# Patient Record
Sex: FEMALE | Race: WHITE | Employment: UNEMPLOYED | URBAN - METROPOLITAN AREA
[De-identification: names, ages, dates, MRNs, and addresses within clinical notes are randomized per-mention and may not be internally consistent; named-entity substitution may affect disease eponyms.]

---

## 2017-01-01 ENCOUNTER — HOSPITAL ENCOUNTER (OUTPATIENT)
Dept: OTHER | Age: 27
Discharge: HOME | End: 2017-01-31
Attending: OBSTETRICS & GYNECOLOGY | Admitting: OBSTETRICS & GYNECOLOGY

## 2017-01-02 PROBLEM — E86.0 DEHYDRATION: Status: ACTIVE | Noted: 2017-01-02

## 2017-02-06 PROBLEM — O47.9 THREATENED LABOR: Status: ACTIVE | Noted: 2017-02-06

## 2018-09-27 PROBLEM — E86.0 DEHYDRATION: Status: RESOLVED | Noted: 2017-01-02 | Resolved: 2018-09-27

## 2019-12-15 ENCOUNTER — APPOINTMENT (OUTPATIENT)
Dept: ULTRASOUND IMAGING | Age: 29
End: 2019-12-15
Payer: COMMERCIAL

## 2019-12-15 ENCOUNTER — HOSPITAL ENCOUNTER (EMERGENCY)
Age: 29
Discharge: HOME | End: 2019-12-16
Attending: EMERGENCY MEDICINE
Payer: COMMERCIAL

## 2019-12-15 DIAGNOSIS — O46.90 VAGINAL BLEEDING IN PREGNANCY: ICD-10-CM

## 2019-12-15 DIAGNOSIS — O20.0 THREATENED MISCARRIAGE: Primary | ICD-10-CM

## 2019-12-15 DIAGNOSIS — Z29.13 NEED FOR RHOGAM DUE TO RH NEGATIVE MOTHER: ICD-10-CM

## 2019-12-15 LAB
A/G RATIO: 1.3 (ref 1.1–2.2)
ABO/RH: NORMAL
ALBUMIN SERPL-MCNC: 4 G/DL (ref 3.4–5)
ALP BLD-CCNC: 42 U/L (ref 40–129)
ALT SERPL-CCNC: 12 U/L (ref 10–40)
ANION GAP SERPL CALCULATED.3IONS-SCNC: 11 MMOL/L (ref 3–16)
ANTIBODY SCREEN: NORMAL
AST SERPL-CCNC: 25 U/L (ref 15–37)
BASOPHILS ABSOLUTE: 0.1 K/UL (ref 0–0.2)
BASOPHILS RELATIVE PERCENT: 0.6 %
BILIRUB SERPL-MCNC: <0.2 MG/DL (ref 0–1)
BUN BLDV-MCNC: 10 MG/DL (ref 7–20)
CALCIUM SERPL-MCNC: 9.1 MG/DL (ref 8.3–10.6)
CHLORIDE BLD-SCNC: 101 MMOL/L (ref 99–110)
CO2: 21 MMOL/L (ref 21–32)
CREAT SERPL-MCNC: 0.6 MG/DL (ref 0.6–1.1)
EOSINOPHILS ABSOLUTE: 0.1 K/UL (ref 0–0.6)
EOSINOPHILS RELATIVE PERCENT: 0.6 %
GFR AFRICAN AMERICAN: >60
GFR NON-AFRICAN AMERICAN: >60
GLOBULIN: 3.1 G/DL
GLUCOSE BLD-MCNC: 126 MG/DL (ref 70–99)
GONADOTROPIN, CHORIONIC (HCG) QUANT: NORMAL MIU/ML
HCT VFR BLD CALC: 37.2 % (ref 36–48)
HEMOGLOBIN: 12.9 G/DL (ref 12–16)
LYMPHOCYTES ABSOLUTE: 2.2 K/UL (ref 1–5.1)
LYMPHOCYTES RELATIVE PERCENT: 20.3 %
MCH RBC QN AUTO: 31.2 PG (ref 26–34)
MCHC RBC AUTO-ENTMCNC: 34.6 G/DL (ref 31–36)
MCV RBC AUTO: 90.1 FL (ref 80–100)
MONOCYTES ABSOLUTE: 0.6 K/UL (ref 0–1.3)
MONOCYTES RELATIVE PERCENT: 5.5 %
NEUTROPHILS ABSOLUTE: 7.7 K/UL (ref 1.7–7.7)
NEUTROPHILS RELATIVE PERCENT: 73 %
PDW BLD-RTO: 12.6 % (ref 12.4–15.4)
PLATELET # BLD: 337 K/UL (ref 135–450)
PMV BLD AUTO: 7.1 FL (ref 5–10.5)
POTASSIUM SERPL-SCNC: 4.8 MMOL/L (ref 3.5–5.1)
RBC # BLD: 4.13 M/UL (ref 4–5.2)
SODIUM BLD-SCNC: 133 MMOL/L (ref 136–145)
TOTAL PROTEIN: 7.1 G/DL (ref 6.4–8.2)
WBC # BLD: 10.6 K/UL (ref 4–11)

## 2019-12-15 PROCEDURE — 86901 BLOOD TYPING SEROLOGIC RH(D): CPT

## 2019-12-15 PROCEDURE — 86850 RBC ANTIBODY SCREEN: CPT

## 2019-12-15 PROCEDURE — 85025 COMPLETE CBC W/AUTO DIFF WBC: CPT

## 2019-12-15 PROCEDURE — 86900 BLOOD TYPING SEROLOGIC ABO: CPT

## 2019-12-15 PROCEDURE — 76801 OB US < 14 WKS SINGLE FETUS: CPT

## 2019-12-15 PROCEDURE — 99284 EMERGENCY DEPT VISIT MOD MDM: CPT

## 2019-12-15 PROCEDURE — 76817 TRANSVAGINAL US OBSTETRIC: CPT

## 2019-12-15 PROCEDURE — 84702 CHORIONIC GONADOTROPIN TEST: CPT

## 2019-12-15 PROCEDURE — 80053 COMPREHEN METABOLIC PANEL: CPT

## 2019-12-15 ASSESSMENT — PAIN SCALES - GENERAL: PAINLEVEL_OUTOF10: 2

## 2019-12-16 VITALS
TEMPERATURE: 98 F | HEART RATE: 79 BPM | BODY MASS INDEX: 20.4 KG/M2 | SYSTOLIC BLOOD PRESSURE: 110 MMHG | RESPIRATION RATE: 18 BRPM | DIASTOLIC BLOOD PRESSURE: 60 MMHG | WEIGHT: 130 LBS | OXYGEN SATURATION: 97 % | HEIGHT: 67 IN

## 2019-12-16 LAB — RHIG LOT NUMBER: NORMAL

## 2019-12-16 PROCEDURE — 2580000003 HC RX 258: Performed by: EMERGENCY MEDICINE

## 2019-12-16 PROCEDURE — 96360 HYDRATION IV INFUSION INIT: CPT

## 2019-12-16 PROCEDURE — 96372 THER/PROPH/DIAG INJ SC/IM: CPT

## 2019-12-16 PROCEDURE — 6360000002 HC RX W HCPCS: Performed by: NURSE PRACTITIONER

## 2019-12-16 RX ORDER — 0.9 % SODIUM CHLORIDE 0.9 %
500 INTRAVENOUS SOLUTION INTRAVENOUS ONCE
Status: COMPLETED | OUTPATIENT
Start: 2019-12-16 | End: 2019-12-16

## 2019-12-16 RX ADMIN — HUMAN RHO(D) IMMUNE GLOBULIN 300 MCG: 300 INJECTION, SOLUTION INTRAMUSCULAR at 02:42

## 2019-12-16 RX ADMIN — SODIUM CHLORIDE 500 ML: 9 INJECTION, SOLUTION INTRAVENOUS at 01:42

## 2020-04-15 ENCOUNTER — TELEPHONE (OUTPATIENT)
Dept: OBGYN CLINIC | Age: 30
End: 2020-04-15

## 2020-04-16 ENCOUNTER — OFFICE VISIT (OUTPATIENT)
Dept: OBGYN CLINIC | Age: 30
End: 2020-04-16
Payer: COMMERCIAL

## 2020-04-16 VITALS
TEMPERATURE: 98 F | HEIGHT: 66 IN | WEIGHT: 131.8 LBS | HEART RATE: 79 BPM | DIASTOLIC BLOOD PRESSURE: 62 MMHG | BODY MASS INDEX: 21.18 KG/M2 | SYSTOLIC BLOOD PRESSURE: 102 MMHG

## 2020-04-16 LAB
BASOPHILS ABSOLUTE: 0 K/UL (ref 0–0.2)
BASOPHILS RELATIVE PERCENT: 0.6 %
EOSINOPHILS ABSOLUTE: 0.1 K/UL (ref 0–0.6)
EOSINOPHILS RELATIVE PERCENT: 1.8 %
GONADOTROPIN, CHORIONIC (HCG) QUANT: <5 MIU/ML
HCT VFR BLD CALC: 38.9 % (ref 36–48)
HEMOGLOBIN: 13.1 G/DL (ref 12–16)
LYMPHOCYTES ABSOLUTE: 2.3 K/UL (ref 1–5.1)
LYMPHOCYTES RELATIVE PERCENT: 43.3 %
MCH RBC QN AUTO: 29.5 PG (ref 26–34)
MCHC RBC AUTO-ENTMCNC: 33.7 G/DL (ref 31–36)
MCV RBC AUTO: 87.4 FL (ref 80–100)
MONOCYTES ABSOLUTE: 0.3 K/UL (ref 0–1.3)
MONOCYTES RELATIVE PERCENT: 6 %
NEUTROPHILS ABSOLUTE: 2.6 K/UL (ref 1.7–7.7)
NEUTROPHILS RELATIVE PERCENT: 48.3 %
PDW BLD-RTO: 13.2 % (ref 12.4–15.4)
PLATELET # BLD: 287 K/UL (ref 135–450)
PMV BLD AUTO: 7.7 FL (ref 5–10.5)
RBC # BLD: 4.45 M/UL (ref 4–5.2)
WBC # BLD: 5.4 K/UL (ref 4–11)

## 2020-04-16 PROCEDURE — 99203 OFFICE O/P NEW LOW 30 MIN: CPT | Performed by: OBSTETRICS & GYNECOLOGY

## 2020-04-16 PROCEDURE — 36415 COLL VENOUS BLD VENIPUNCTURE: CPT | Performed by: OBSTETRICS & GYNECOLOGY

## 2020-04-16 PROCEDURE — 76856 US EXAM PELVIC COMPLETE: CPT | Performed by: OBSTETRICS & GYNECOLOGY

## 2020-04-16 RX ORDER — PNV NO.118/IRON FUMARATE/FA 29 MG-1 MG
1 TABLET,CHEWABLE ORAL DAILY
COMMUNITY

## 2020-04-16 NOTE — PROGRESS NOTES
Cardiovascular: Negative for chest pain and palpitations. Gastrointestinal: Negative for abdominal pain, constipation, diarrhea, nausea and vomiting. Genitourinary: Positive for menstrual problem and vaginal bleeding. Negative for dyspareunia, dysuria, frequency, pelvic pain and vaginal discharge. Musculoskeletal: Negative for back pain. Skin: Negative. Neurological: Negative. Psychiatric/Behavioral: Negative. Primary Care Physician: Do Not Send To PCP (Inactive)    Obstetric History  OB History    Para Term  AB Living   4 2 2 0 1 2   SAB TAB Ectopic Molar Multiple Live Births   1 0 0   0 2      # Outcome Date GA Lbr Omero/2nd Weight Sex Delivery Anes PTL Lv   4 Current            3 SAB 2019           2 Term 17 40w2d  7 lb 11 oz (3.487 kg) M Vag-Spont None N HARPER   1 Term 14 39w3d 04:03 7 lb 3 oz (3.26 kg) F Vag-Spont None  HARPER       GynecologicHistory  Menstrual History:   LMP: Patient's last menstrual period was 2020 (approximate).  Menstrual Period: regular   Interval Between Menses: 28-30 days   Duration of Menses: 7 days   Menstrual Flow: Heavier on days 2-4   Bleeding between menses: Denies    Sexual History:   Contraception: see above   Currently is sexually active   2 Lifetime partners   Denies history of STIs   Denies sexual problems    Pap History:   History of abnormal pap smears: see above   Last pap: see above      Medical History:  Past Medical History:   Diagnosis Date    Rh incompatibility        Medications:  Current Outpatient Medications   Medication Sig Dispense Refill    Prenatal Vit-Fe Fumarate-FA (PRENATAL VITAMIN) CHEW Take 1 tablet by mouth daily      Misc Natural Products (PROGESTERONE EX) Apply 1 applicator topically daily Internal Pe[place]am 25mg natural progesterone cream & 2000 IU Vitamin D       No current facility-administered medications for this visit.         Surgical History:  Past Surgical History:   Procedure DOPPLER INTERROGATION   NON OB    DATE: 4/16/20    PHYSICIAN: GÉNESIS Barnett.     SONOGRAPHER: Fern Lindsay RUST    INDICATION: Bleeding with a positive preg test    TYPE OF SCAN: vaginal    FINDINGS:    The cul de sac is normal. No free fluid appreciated. The cervix is normal and not enlarged. Nabothian cyst/s is not noted within the uterine cervix. The uterus measures 8.71 cm x 5.32 cm x 4.01 cm. The uterus is anteverted. The endometrium measures 4.25 mm. The myometrium is homogeneous in appearance. No uterine anomalies are noted. The right ovary is present and normal.    The right ovary measures 1.78 cm x 2.26 cm x 1.66 cm. Ovary findings: No masses seen. The right adnexa is normal.    The left ovary is present and normal.    The left ovary measures 2.17 cm x 2.14 cm x 1.79 cm. Ovary findings: No masses seen. The left adnexa is normal.    IMPRESSION: Normal uterus. Normal right ovary. Normal left ovary.     Assessment/Plan:  30 y.o. [id number] presenting for evaluation of vaginal bleeding with a positive pregnancy test.     1. Complete miscarriage     - Reviewed with findings of positive pregnancy test at home, negative pregnancy test today in office, negative pelvic ultrasound and exam in office the differential includes complete AB vs false positive pregnancy test and onset of menses     - Will order labs and trend hCG levels   - CBC Auto Differential   - Type and Screen   - HCG, Quantitative, Pregnancy     - US today with thin ES 4mm, reviewed with this lining it could be that this is the onset of a normal menstrual cycle with a false positive pregnancy test - especially in light of leaving the test for a longer amount of time before it became positive     - Ectopic pregnancy cannot be excluded, however no evidence on US today concerning for ectopic pregnancy and negative urine pregnancy test     - Bleeding and pain precautions reviewed     - Will follow-up based on lab results     - Patient does need annual exam and pap smear, however she does not want to complete today, will have return to office for such.       Mirna Martinez, DO

## 2020-04-17 LAB
ABO/RH: NORMAL
ANTIBODY SCREEN: NORMAL
ANTIBODY SCREEN: NORMAL

## 2020-04-27 ASSESSMENT — ENCOUNTER SYMPTOMS
SHORTNESS OF BREATH: 0
NAUSEA: 0
COUGH: 0
CONSTIPATION: 0
VOMITING: 0
DIARRHEA: 0
ABDOMINAL PAIN: 0
BACK PAIN: 0
SORE THROAT: 0

## 2020-07-08 ENCOUNTER — OFFICE VISIT (OUTPATIENT)
Dept: OBGYN CLINIC | Age: 30
End: 2020-07-08
Payer: COMMERCIAL

## 2020-07-08 ENCOUNTER — TELEPHONE (OUTPATIENT)
Dept: OBGYN CLINIC | Age: 30
End: 2020-07-08

## 2020-07-08 VITALS
SYSTOLIC BLOOD PRESSURE: 98 MMHG | BODY MASS INDEX: 20.47 KG/M2 | DIASTOLIC BLOOD PRESSURE: 62 MMHG | HEIGHT: 67 IN | WEIGHT: 130.4 LBS | HEART RATE: 119 BPM | TEMPERATURE: 98.3 F

## 2020-07-08 LAB
CONTROL: ABNORMAL
CRL: NORMAL
PREGNANCY TEST URINE, POC: POSITIVE
SAC DIAMETER: NORMAL

## 2020-07-08 PROCEDURE — 81025 URINE PREGNANCY TEST: CPT | Performed by: OBSTETRICS & GYNECOLOGY

## 2020-07-08 PROCEDURE — 99213 OFFICE O/P EST LOW 20 MIN: CPT | Performed by: OBSTETRICS & GYNECOLOGY

## 2020-07-08 PROCEDURE — 76801 OB US < 14 WKS SINGLE FETUS: CPT | Performed by: OBSTETRICS & GYNECOLOGY

## 2020-07-08 NOTE — PROGRESS NOTES
Return Gyn Office Visit    CC:   Chief Complaint   Patient presents with    Follow-up     Missed menses       HPI:  Adam Jim is a 30 y.o. female who presents for evaluation following missed menses and positive home pregnancy test.      Patient is seen and examined today. Patient reports menses have been regular following SAB. LMP 5/8/2020. Has been taking PNV regularly. Denies vaginal bleeding. States mild bilateral pelvic tenderness, no sharp pains or cramping. Reports increased nausea with decreased appetite. Denies headache, vision changes, RUQ pain. Denies chest pain, shortness of breath, fever, chills. Objective:  BP 98/62 (Site: Left Upper Arm, Position: Sitting, Cuff Size: Medium Adult)   Pulse 119   Temp 98.3 °F (36.8 °C) (Oral)   Ht 5' 7\" (1.702 m)   Wt 130 lb 6.4 oz (59.1 kg)   LMP 05/08/2020 (Exact Date)   Breastfeeding No   BMI 20.42 kg/m²     Physical Exam  Vitals signs reviewed. Exam conducted with a chaperone present. Constitutional:       General: She is not in acute distress. Appearance: She is well-developed. HENT:      Head: Normocephalic and atraumatic. Eyes:      Conjunctiva/sclera: Conjunctivae normal.   Cardiovascular:      Rate and Rhythm: Normal rate. Pulmonary:      Effort: Pulmonary effort is normal. No respiratory distress. Abdominal:      General: There is no distension. Palpations: Abdomen is soft. Tenderness: There is no abdominal tenderness. There is no guarding or rebound. Genitourinary:     General: Normal vulva. Exam position: Lithotomy position. Labia:         Right: No rash, tenderness or lesion. Left: No rash, tenderness or lesion. Musculoskeletal:         General: No swelling. Skin:     General: Skin is warm and dry. Neurological:      Mental Status: She is alert and oriented to person, place, and time.    Psychiatric:         Mood and Affect: Mood normal.         Behavior: Behavior normal. Thought Content: Thought content normal.        Ultrasound:   OBSTETRIC ULTRASOUND--1ST TRIMESTER    DATE: 7/8/20    PHYSICIAN: GÉNESIS Haynes.     SONOGRAPHER: Cristina Alva RDMS    INDICATION: Amenorrhea    TYPE OF SCAN:  vaginal    FINDINGS:      The cul de sac is normal.  The cervix is normal.  The uterus is gravid. The uterus measures 10.67 cm x 8.23 cm x 7.09 cm. No uterine anomalies are evident. The right ovary is present. The right ovary measures 1.61 cm x 1.42 cm x 2.31 cm. The left ovary is present. The left ovary measures 3.26 cm x 2.47 cm x 1.93 cm. There is a single intrauterine pregnancy identified. A fetal pole is noted with a CRL measuring 2.01 cm, consistent with gestational age of 8weeks and 4days and EDC of 2/13/21. There is a 1 day discrepancy when compared with the gestational age of 8weeks and 5days and EDC of 2/12/21 set by FDLMP (5/8/20). Yolk sac is normal and measures 0.41 cm. Fetal cardiac activity is normal at 180 bpm.     IMPRESSION:    Normal appearing single IUP with cardiac activity. Imaging is limited secondary to bowel gas. Patient is well aware of the limitations of ultrasound in the detection of anomalies. Assessment/Plan:     Sallie Wasserman is a 30 y.o. female who presents for evaluation following missed menses and positive home pregnancy test.        1. Positive pregnancy test     - Patient with positive home pregnancy test and IUP consistent with FLDMP on US     - Patient declined pap smear stating she does not want exams     - Reviewed indications for gonorrhea and chlamydia testing and patient allowed blind swabs vaginally, declined speculum.    - C.trachomatis N.gonorrhoeae DNA   - URINALYSIS WITH MICROSCOPIC   - Culture, Urine   - POCT urine pregnancy   - VAGINAL PATHOGENS PROBE *A     - Upon review of US patient and  were asked to utilize masks in office due to [employer] as well as [employer]. Both declined.       - Aneuploidy screening: Declined     - Carrier screening: Declined     - Early pregnancy physiology and precautions reviewed     - Return precautions provided     - Will follow-up for IPV in 4 weeks    Upon checkout notified patient declined to schedule follow-up appointments.         Jignesh Cabello DO

## 2020-07-09 LAB
AMORPHOUS: ABNORMAL /HPF
BACTERIA: ABNORMAL /HPF
BILIRUBIN URINE: NEGATIVE
BLOOD, URINE: NEGATIVE
C TRACH DNA GENITAL QL NAA+PROBE: NEGATIVE
CANDIDA SPECIES, DNA PROBE: NORMAL
CLARITY: CLEAR
COLOR: YELLOW
CRYSTALS, UA: ABNORMAL /HPF
EPITHELIAL CELLS, UA: 13 /HPF (ref 0–5)
GARDNERELLA VAGINALIS, DNA PROBE: NORMAL
GLUCOSE URINE: NEGATIVE MG/DL
KETONES, URINE: NEGATIVE MG/DL
LEUKOCYTE ESTERASE, URINE: ABNORMAL
MICROSCOPIC EXAMINATION: YES
MUCUS: ABNORMAL /LPF
N. GONORRHOEAE DNA: NEGATIVE
NITRITE, URINE: NEGATIVE
PH UA: 6 (ref 5–8)
PROTEIN UA: NEGATIVE MG/DL
RBC UA: ABNORMAL /HPF (ref 0–4)
SPECIFIC GRAVITY UA: >=1.03 (ref 1–1.03)
TRICHOMONAS VAGINALIS DNA: NORMAL
URINE CULTURE, ROUTINE: NORMAL
URINE TYPE: ABNORMAL
UROBILINOGEN, URINE: 0.2 E.U./DL
WBC UA: 61 /HPF (ref 0–5)

## 2020-08-07 ENCOUNTER — TELEPHONE (OUTPATIENT)
Dept: OBGYN CLINIC | Age: 30
End: 2020-08-07

## 2020-08-07 NOTE — LETTER
[address] [facility]  [address] [facility]  [address] [place] [address]  Dept: [phone]  Dept Fax: [phone]  Loc: [phone]      9/17/2020    Dear Levis Meigs,    I find it necessary to inform you that I must withdraw my professional commitment to you as a OB/GYN physician due to a breakdown in the doctor/patient relationship. It is essential that you continue to receive medical care for your condition. Therefore, I recommend you make immediate arrangements with another physician to provide the needed care. For emergency medical services, please go to the nearest emergency room for treatment. If you wish, I will continue to treat your urgent medical needs which may develop for the following thirty (30) days from today's date. Enclosed is a form authorizing me to release a copy of your medical records to your new treating physician. I will forward your records promptly upon receipt of this form, signed by you, with completed name and address of the physician to receive your records. If you have any questions regarding the contents of this letter, you may reach me at my office during normal business hours.     Respectfully,        Cheri Hudson, DO

## 2020-08-07 NOTE — TELEPHONE ENCOUNTER
Left message per HIPAA to call the office back and schedule her IPV appointment. Sending [employer] message as well. Routing to Dr. Mini uDran as Maximilian Grijalva.

## 2020-08-26 ENCOUNTER — TELEPHONE (OUTPATIENT)
Dept: OBGYN CLINIC | Age: 30
End: 2020-08-26

## 2020-08-26 NOTE — TELEPHONE ENCOUNTER
Left message for patient to call the office so we can schedule her initial prenatal appointment with Dr. Edwinna Sandifer. MyChart message sent as well.

## 2020-09-09 NOTE — TELEPHONE ENCOUNTER
Left message for patient to call back to schedule her IPV visit. Will send [employer] message as well. Routing to Dr. Kristan Mcdonald as Jonny Mena.

## 2020-09-16 NOTE — TELEPHONE ENCOUNTER
Forwarding to Dr. Doni Hernandez as we've reached out to patient by phone and [address] on 9/9/20, 8/26/20, and 8/7/20 with no return call or MyChart to schedule her IPV.

## 2020-09-17 NOTE — TELEPHONE ENCOUNTER
Dismissal letter mailed to patient. Included a records request for when she finds a new provider. Letter mailed today.

## 2021-06-02 ENCOUNTER — TELEPHONE (OUTPATIENT)
Dept: OBGYN CLINIC | Age: 31
End: 2021-06-02

## 2021-06-02 NOTE — TELEPHONE ENCOUNTER
Pt's  Vasquez Graham calling regarding billing issue. Pt was seen on 4/16/20. He stated he paid out of pocket for visit and then got Financial assistance approved. He says he spoke with [facility] and was told he would need to call our office for reimbursement. Please advise. Routing to Manager.